# Patient Record
Sex: FEMALE | Race: WHITE
[De-identification: names, ages, dates, MRNs, and addresses within clinical notes are randomized per-mention and may not be internally consistent; named-entity substitution may affect disease eponyms.]

---

## 2017-04-07 ENCOUNTER — HOSPITAL ENCOUNTER (EMERGENCY)
Dept: HOSPITAL 79 - ER1 | Age: 52
Discharge: HOME | End: 2017-04-07
Payer: COMMERCIAL

## 2017-04-07 DIAGNOSIS — F17.210: ICD-10-CM

## 2017-04-07 DIAGNOSIS — J45.909: ICD-10-CM

## 2017-04-07 DIAGNOSIS — Z79.899: ICD-10-CM
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
## 2017-04-07 DIAGNOSIS — Z88.0: ICD-10-CM
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
## 2017-04-07 DIAGNOSIS — Z88.5: ICD-10-CM

## 2017-04-07 DIAGNOSIS — S61.412A: Primary | ICD-10-CM

## 2022-01-22 ENCOUNTER — HOSPITAL ENCOUNTER (EMERGENCY)
Dept: HOSPITAL 79 - ER1 | Age: 57
Discharge: HOME | End: 2022-01-22
Payer: COMMERCIAL

## 2022-01-22 DIAGNOSIS — Z87.442: ICD-10-CM

## 2022-01-22 DIAGNOSIS — R10.9: Primary | ICD-10-CM

## 2022-01-22 DIAGNOSIS — F17.200: ICD-10-CM

## 2022-01-22 DIAGNOSIS — E87.6: ICD-10-CM

## 2022-01-22 LAB
BUN/CREATININE RATIO: 17 (ref 0–10)
HGB BLD-MCNC: 12.2 GM/DL (ref 12.3–15.3)
RED BLOOD COUNT: 4.29 M/UL (ref 4–5.1)
WHITE BLOOD COUNT: 5.8 K/UL (ref 4.5–11)

## 2022-05-12 ENCOUNTER — TELEMEDICINE (OUTPATIENT)
Dept: PSYCHIATRY | Facility: CLINIC | Age: 57
End: 2022-05-12

## 2022-05-12 VITALS
DIASTOLIC BLOOD PRESSURE: 86 MMHG | HEIGHT: 59 IN | BODY MASS INDEX: 33.83 KG/M2 | OXYGEN SATURATION: 96 % | SYSTOLIC BLOOD PRESSURE: 128 MMHG | WEIGHT: 167.8 LBS | HEART RATE: 100 BPM

## 2022-05-12 DIAGNOSIS — F11.11 HISTORY OF HEROIN ABUSE: ICD-10-CM

## 2022-05-12 DIAGNOSIS — G47.9 SLEEP DIFFICULTIES: ICD-10-CM

## 2022-05-12 DIAGNOSIS — F15.10 METHAMPHETAMINE ABUSE: ICD-10-CM

## 2022-05-12 DIAGNOSIS — F41.1 GENERALIZED ANXIETY DISORDER: Primary | ICD-10-CM

## 2022-05-12 DIAGNOSIS — F11.20 OPIOID DEPENDENCE IN CONTROLLED ENVIRONMENT (HCC): ICD-10-CM

## 2022-05-12 PROCEDURE — 90792 PSYCH DIAG EVAL W/MED SRVCS: CPT | Performed by: NURSE PRACTITIONER

## 2022-05-12 RX ORDER — NALTREXONE HYDROCHLORIDE 50 MG/1
50 TABLET, FILM COATED ORAL DAILY
COMMUNITY

## 2022-05-12 RX ORDER — TRAZODONE HYDROCHLORIDE 100 MG/1
100 TABLET ORAL NIGHTLY
Qty: 30 TABLET | Refills: 0 | Status: SHIPPED | OUTPATIENT
Start: 2022-05-12 | End: 2022-06-09 | Stop reason: SDUPTHER

## 2022-05-12 RX ORDER — TRAZODONE HYDROCHLORIDE 50 MG/1
50 TABLET ORAL NIGHTLY
COMMUNITY
End: 2022-05-12 | Stop reason: SDUPTHER

## 2022-05-12 RX ORDER — MIRTAZAPINE 15 MG/1
7.5 TABLET, FILM COATED ORAL NIGHTLY
COMMUNITY
End: 2022-05-12

## 2022-05-12 RX ORDER — DULOXETIN HYDROCHLORIDE 30 MG/1
30 CAPSULE, DELAYED RELEASE ORAL DAILY
Qty: 30 CAPSULE | Refills: 0 | Status: SHIPPED | OUTPATIENT
Start: 2022-05-12 | End: 2022-06-09 | Stop reason: SDUPTHER
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
## 2022-06-09 ENCOUNTER — TELEMEDICINE (OUTPATIENT)
Dept: PSYCHIATRY | Facility: CLINIC | Age: 57
End: 2022-06-09

## 2022-06-09 VITALS
DIASTOLIC BLOOD PRESSURE: 78 MMHG | WEIGHT: 169 LBS | TEMPERATURE: 97.6 F | SYSTOLIC BLOOD PRESSURE: 118 MMHG | BODY MASS INDEX: 34.07 KG/M2 | HEART RATE: 72 BPM | HEIGHT: 59 IN

## 2022-06-09 DIAGNOSIS — F11.20 OPIOID DEPENDENCE IN CONTROLLED ENVIRONMENT (HCC): ICD-10-CM

## 2022-06-09 DIAGNOSIS — F41.1 GENERALIZED ANXIETY DISORDER: Primary | ICD-10-CM

## 2022-06-09 DIAGNOSIS — G47.9 SLEEP DIFFICULTIES: ICD-10-CM

## 2022-06-09 DIAGNOSIS — F11.11 HISTORY OF HEROIN ABUSE: ICD-10-CM

## 2022-06-09 DIAGNOSIS — F15.10 METHAMPHETAMINE ABUSE: ICD-10-CM

## 2022-06-09 PROCEDURE — 99214 OFFICE O/P EST MOD 30 MIN: CPT | Performed by: NURSE PRACTITIONER

## 2022-06-09 RX ORDER — DULOXETIN HYDROCHLORIDE 60 MG/1
60 CAPSULE, DELAYED RELEASE ORAL DAILY
COMMUNITY
End: 2022-06-16 | Stop reason: SDUPTHER

## 2022-06-09 RX ORDER — DULOXETIN HYDROCHLORIDE 60 MG/1
60 CAPSULE, DELAYED RELEASE ORAL DAILY
Qty: 30 CAPSULE | Refills: 0 | Status: SHIPPED | OUTPATIENT
Start: 2022-06-09 | End: 2022-06-09

## 2022-06-09 RX ORDER — TRAZODONE HYDROCHLORIDE 100 MG/1
100 TABLET ORAL NIGHTLY
Qty: 30 TABLET | Refills: 0 | Status: SHIPPED | OUTPATIENT
Start: 2022-06-09 | End: 2022-07-07 | Stop reason: SDUPTHER

## 2022-06-09 NOTE — PROGRESS NOTES
"This provider is located at [facility], [address] [place] [address].The Patient is seen remotely at [facility], [address] [address] via [employer]. Patient is being seen via telehealth and confirm that they are in a secure environment for this session. The patient's condition being diagnosed/treated is appropriate for telemedicine. The provider identified himself/herself: herself as well as her credentials.   The patient gave consent to be seen remotely, and when consent is given they understand that the consent allows for patient identifiable information to be sent to a third party as needed.   They may refuse to be seen remotely at any time. The electronic data is encrypted and password protected, and the patient has been advised of the potential risks to privacy not withstanding such measures.    You have chosen to receive care through a telehealth visit.  Do you consent to use a video/audio connection for your medical care today? Yes. Patient verified Name, , and address.       Chief Complaint  Anxiety     Subjective          Michelle Radha Pacheco presents to [facility] for medication management.     History of Present Illness  Patient presents today at [facility] accompanied by STEFANIE Valdez but interviewed alone.  Patient states that she has been doing \"pretty well\".  It is noted that she has been moved over to the [facility] last week in which she states her anxiety has been increased.  She notes that she has felt more \"jittery and nervous\".  Patient states that she realizes that she is phasing up and has more responsibility so that has made her anxious.  Patient denies any side effects with the medications.  She rates her anxiety as 7 on a scale of 0-10 with 10 being the worst.  Denies any major depressive issues.  Reports she is sleeping and eating well.  According to STEFANIE Valdez patient's Cymbalta was increased to " "60 mg roughly 3 weeks ago for her neuropathic pains.  Informed patient that it may take a few more weeks before she notices a decrease in her anxiety but if she any issues to let LPN Courtney aware both verbalized understanding.  Patient notes still continues pain and encouraged her to follow up with her PCP next week and mention so.  Denies any SI/HI/AH/VH.        Objective   Vital Signs:   /78   Pulse 72   Temp 97.6 °F (36.4 °C)   Ht 149.9 cm (59\")   Wt 76.7 kg (169 lb)   BMI 34.13 kg/m²       PHQ-9 Score:   PHQ-9 Total Score: (P) 2     Patient screened positive for depression based on a PHQ-9 score of  on . Follow-up recommendations include: see notes and medication list.    PHQ-9 Depression Screening  Little interest or pleasure in doing things? (P) 0   Feeling down, depressed, or hopeless? (P) 0   Trouble falling or staying asleep, or sleeping too much? (P) 0   Feeling tired or having little energy? (P) 1   Poor appetite or overeating? (P) 0   Feeling bad about yourself - or that you are a failure or have let yourself or your family down? (P) 0   Trouble concentrating on things, such as reading the newspaper or watching television? (P) 1   Moving or speaking so slowly that other people could have noticed? Or the opposite - being so fidgety or restless that you have been moving around a lot more than usual? (P) 0   Thoughts that you would be better off dead, or of hurting yourself in some way? (P) 0   PHQ-9 Total Score (P) 2   If you checked off any problems, how difficult have these problems made it for you to do your work, take care of things at home, or get along with other people? (P) Not difficult at all     PHQ-9 Total Score: (P) 2      Feeling nervous, anxious or on edge: (P) Several days  Not being able to stop or control worrying: (P) Not at all  Worrying too much about different things: (P) Not at all  Trouble Relaxing: (P) Not at all  Being so restless that it is hard to sit still: (P) Not at " all  Feeling afraid as if something awful might happen: (P) Not at all  Becoming easily annoyed or irritable: (P) Not at all  MAXI 7 Total Score: (P) 1  If you checked any problems, how difficult have these problems made it for you to do your work, take care of things at home, or get along with other people: (P) Somewhat difficult      PROMIS scale screening tool that patient filled out virtually reviewed by this APRN at today's encounter.      Mental Status Exam:   Hygiene:   good  Cooperation:  Cooperative  Eye Contact:  Good  Psychomotor Behavior:  Restless  Affect:  Appropriate  Mood: normal and anxious  Speech:  Normal  Thought Process:  Goal directed  Thought Content:  Normal  Suicidal:  None  Homicidal:  None  Hallucinations:  None  Delusion:  None  Memory:  Intact  Orientation:  Person, Place, Time and Situation  Reliability:  good  Insight:  Good and Fair  Judgement:  Good and Fair  Impulse Control:  Good and Fair  Physical/Medical Issues:  Yes COPD and rheumatoid arthritis     Current Medications:   Current Outpatient Medications   Medication Sig Dispense Refill   • DULoxetine (CYMBALTA) 60 MG capsule Take 60 mg by mouth Daily.     • naltrexone (DEPADE) 50 MG tablet Take 50 mg by mouth Daily.     • traZODone (DESYREL) 100 MG tablet Take 1 tablet by mouth Every Night. 30 tablet 0   • Albuterol Sulfate (PROAIR HFA IN) Inhale.       No current facility-administered medications for this visit.       Physical Exam  Vitals and nursing note reviewed.   Constitutional:       Appearance: Normal appearance.   Neurological:      Mental Status: She is alert.   Psychiatric:         Attention and Perception: Attention and perception normal.         Mood and Affect: Affect normal. Mood is anxious.         Speech: Speech normal.         Behavior: Behavior is cooperative.         Thought Content: Thought content normal.         Cognition and Memory: Cognition and memory normal.        Result Review :     The following data  was reviewed by: FABIAN Prakash on 06/09/2022:      Data reviewed: see media tab from [facility]        Assessment and Plan    Problem List Items Addressed This Visit    None     Visit Diagnoses     Generalized anxiety disorder    -  Primary    Relevant Medications    traZODone (DESYREL) 100 MG tablet    DULoxetine (CYMBALTA) 60 MG capsule    Sleep difficulties        Relevant Medications    traZODone (DESYREL) 100 MG tablet    Methamphetamine abuse (HCC)        Opioid dependence in controlled environment (HCC)        History of heroin abuse (HCC)                TREATMENT PLAN/GOALS: Continue supportive psychotherapy efforts and medications as indicated. Treatment and medication options discussed during today's visit. Patient ackowledged and verbally consented to continue with current treatment plan and was educated on the importance of compliance with treatment and follow-up appointments.    MEDICATION ISSUES:  We discussed risks, benefits, and side effects of the above medications and the patient was agreeable with the plan. Patient was educated on the importance of compliance with treatment and follow-up appointments.  Patient is agreeable to call the office with any worsening of symptoms or onset of side effects. Patient is agreeable to call 911 or go to the nearest ER should he/she begin having SI/HI.      -Continue trazodone 100 mg at night for sleep.  -Patient's PCP is prescribing her duloxetine at 60 mg daily for her RA.  Encourage patient that it may be a few more weeks before she notices any difference with her anxiety as well.  Encouraged her if it had not improved to make LPN Courtney aware as week make some changes patient verbalized understanding.      Counseled patient regarding multimodal approach with healthy nutrition, healthy sleep, regular physical activity, social activities, counseling, and medications.      Coping skills reviewed and encouraged positive framing of thoughts     Assisted  patient in processing above session content; acknowledged and normalized patient’s thoughts, feelings, and concerns.  Applied  positive coping skills and behavior management in session.  Allowed patient to freely discuss issues without interruption or judgment. Provided safe, confidential environment to facilitate the development of positive therapeutic relationship and encourage open, honest communication. Assisted patient in identifying risk factors which would indicate the need for higher level of care including thoughts to harm self or others and/or self-harming behavior and encouraged patient to contact this office, call 911, or present to the nearest emergency room should any of these events occur. Discussed crisis intervention services and means to access.     MEDS ORDERED DURING VISIT:  New Medications Ordered This Visit   Medications   • traZODone (DESYREL) 100 MG tablet     Sig: Take 1 tablet by mouth Every Night.     Dispense:  30 tablet     Refill:  0           Follow Up   Return in about 4 weeks (around 7/7/2022), or if symptoms worsen or fail to improve, for Recheck.    Patient was given instructions and counseling regarding her condition or for health maintenance advice. Please see specific information pulled into the AVS if appropriate.     Some of the data in this electronic note has been brought forward from a previous encounter, any necessary changes have been made, it has been reviewed by this APRN, and it is accurate.    This document has been electronically signed by FABIAN Prakash  June 9, 2022 13:43 EDT    Part of this note may be an electronic transcription/translation of spoken language to printed text using the Dragon Dictation System.

## 2022-06-16 DIAGNOSIS — F41.1 GENERALIZED ANXIETY DISORDER: ICD-10-CM

## 2022-06-16 RX ORDER — DULOXETIN HYDROCHLORIDE 30 MG/1
CAPSULE, DELAYED RELEASE ORAL
Qty: 90 CAPSULE | Refills: 0 | Status: SHIPPED | OUTPATIENT
Start: 2022-06-16 | End: 2022-07-07 | Stop reason: SDUPTHER

## 2022-06-16 NOTE — PROGRESS NOTES
According to STEFANIE Valdez patient had mentioned to her PCP some depressive and anxiety symptoms so was placed on Paxil 10 mg as PCP was not aware that she was seeing behavioral health.  Courtney WATTS intervened and patient did state that she was feeling more down and depressed lately and anxious.  She encouraged patient that she has to let her know the symptoms and she does have a mental health provider and inform PCP so PCP discontinued paroxetine and told her to follow up with myself.  Will change Cymbalta and increased.  Patient is to take 30 mg in the a.m. and 60 mg in the evening to help with depression and anxiety symptoms as well as pain.  If ineffective by next visit we will reevaluate once seeing patient.  STEFANIE Valdez verbalized understanding.

## 2022-07-07 ENCOUNTER — TELEMEDICINE (OUTPATIENT)
Dept: PSYCHIATRY | Facility: CLINIC | Age: 57
End: 2022-07-07

## 2022-07-07 VITALS
HEART RATE: 105 BPM | TEMPERATURE: 99 F | WEIGHT: 166 LBS | SYSTOLIC BLOOD PRESSURE: 126 MMHG | DIASTOLIC BLOOD PRESSURE: 90 MMHG | HEIGHT: 59 IN | BODY MASS INDEX: 33.47 KG/M2 | OXYGEN SATURATION: 98 %

## 2022-07-07 DIAGNOSIS — G47.9 SLEEP DIFFICULTIES: ICD-10-CM

## 2022-07-07 DIAGNOSIS — F11.11 HISTORY OF HEROIN ABUSE: ICD-10-CM

## 2022-07-07 DIAGNOSIS — F41.1 GENERALIZED ANXIETY DISORDER: Primary | ICD-10-CM

## 2022-07-07 DIAGNOSIS — F11.20 OPIOID DEPENDENCE IN CONTROLLED ENVIRONMENT (HCC): ICD-10-CM

## 2022-07-07 DIAGNOSIS — F15.10 METHAMPHETAMINE ABUSE: ICD-10-CM

## 2022-07-07 PROCEDURE — 99214 OFFICE O/P EST MOD 30 MIN: CPT | Performed by: NURSE PRACTITIONER

## 2022-07-07 RX ORDER — LORATADINE 10 MG/1
10 CAPSULE, LIQUID FILLED ORAL DAILY
COMMUNITY

## 2022-07-07 RX ORDER — TRAZODONE HYDROCHLORIDE 100 MG/1
100 TABLET ORAL NIGHTLY
Qty: 90 TABLET | Refills: 0 | Status: SHIPPED | OUTPATIENT
Start: 2022-07-07

## 2022-07-07 RX ORDER — DULOXETIN HYDROCHLORIDE 30 MG/1
CAPSULE, DELAYED RELEASE ORAL
Qty: 270 CAPSULE | Refills: 0 | Status: SHIPPED | OUTPATIENT
Start: 2022-07-07

## 2022-07-07 RX ORDER — TIZANIDINE HYDROCHLORIDE 4 MG/1
4 CAPSULE, GELATIN COATED ORAL 3 TIMES DAILY PRN
COMMUNITY

## 2022-07-07 RX ORDER — BUDESONIDE AND FORMOTEROL FUMARATE DIHYDRATE 160; 4.5 UG/1; UG/1
2 AEROSOL RESPIRATORY (INHALATION)
COMMUNITY

## 2022-07-07 NOTE — PROGRESS NOTES
This provider is located at [facility], [address] [place] [address].The Patient is seen remotely at [address] [address] [facility]. Patient is being seen via telehealth and confirm that they are in a secure environment for this session. The patient's condition being diagnosed/treated is appropriate for telemedicine. The provider identified himself/herself: herself as well as her credentials.   The patient gave consent to be seen remotely, and when consent is given they understand that the consent allows for patient identifiable information to be sent to a third party as needed.   They may refuse to be seen remotely at any time. The electronic data is encrypted and password protected, and the patient has been advised of the potential risks to privacy not withstanding such measures.    You have chosen to receive care through a telehealth visit.  Do you consent to use a video/audio connection for your medical care today? Yes. Patient verified Name, , and address.       Chief Complaint  Follow-up for anxiety     Subjective    Michelle Radha Pacheco presents to [facility] for medication management.     History of Present Illness  Patient presents today at [facility] accompanied by STEFANIE Valdez but interviewed alone.  Patient states that she is doing much better with the change in Cymbalta.  She denies any depressive symptoms or hopelessness or helplessness.  Patient states that she is falling asleep well with the trazodone it is just her hip and back pain that wakes her up but not and affects her sleep but otherwise she is sleeping well getting 5 to 6 hours at night.  Reports her appetite is good.  Denies any anxiety symptoms.  See PHQ-9 and MAXI-7.  Patient states that she leaves on  this month as she will be living with her mother and father and helping out with her father who is sick at the time.  " Patient states that she has been to be doing NA programs and has already been to 4 meetings.  Patient states that she is going to continue with behavioral health for therapy and her PCP for medication and then she has an appointment with [facility] for therapy as well.  Updated med list per LPN Courtney as patient had not updated.  Patient denied any side effects to the medications.  Denied any SI/HI/AH/VH.        Objective   Vital Signs:   /90   Pulse 105   Temp 99 °F (37.2 °C)   Ht 149.9 cm (59\")   Wt 75.3 kg (166 lb)   SpO2 98%   BMI 33.53 kg/m²       PHQ-9 Score:   PHQ-9 Total Score: (P) 1     Patient screened positive for depression based on a PHQ-9 score of  on . Follow-up recommendations include: see notes and medication list.    PHQ-9 Depression Screening  Little interest or pleasure in doing things? (P) 0   Feeling down, depressed, or hopeless? (P) 0   Trouble falling or staying asleep, or sleeping too much? (P) 0   Feeling tired or having little energy? (P) 1   Poor appetite or overeating? (P) 0   Feeling bad about yourself - or that you are a failure or have let yourself or your family down? (P) 0   Trouble concentrating on things, such as reading the newspaper or watching television? (P) 0   Moving or speaking so slowly that other people could have noticed? Or the opposite - being so fidgety or restless that you have been moving around a lot more than usual? (P) 0   Thoughts that you would be better off dead, or of hurting yourself in some way? (P) 0   PHQ-9 Total Score (P) 1   If you checked off any problems, how difficult have these problems made it for you to do your work, take care of things at home, or get along with other people? (P) Not difficult at all     PHQ-9 Total Score: (P) 1      Feeling nervous, anxious or on edge: (P) Not at all  Not being able to stop or control worrying: (P) Not at all  Worrying too much about different things: (P) Not at all  Trouble Relaxing: (P) Not at " all  Being so restless that it is hard to sit still: (P) Not at all  Feeling afraid as if something awful might happen: (P) Not at all  Becoming easily annoyed or irritable: (P) Not at all  MAXI 7 Total Score: (P) 0  If you checked any problems, how difficult have these problems made it for you to do your work, take care of things at home, or get along with other people: (P) Not difficult at all      PROMIS scale screening tool that patient filled out virtually reviewed by this APRN at today's encounter.      Mental Status Exam:   Hygiene:   good  Cooperation:  Cooperative  Eye Contact:  Good  Psychomotor Behavior:  Restless  Affect:  Appropriate  Mood: normal  Speech:  Normal  Thought Process:  Goal directed  Thought Content:  Normal  Suicidal:  None  Homicidal:  None  Hallucinations:  None  Delusion:  None  Memory:  Intact  Orientation:  Person, Place, Time and Situation  Reliability:  good  Insight:  Good and Fair  Judgement:  Good and Fair  Impulse Control:  Good and Fair  Physical/Medical Issues:  Yes COPD and rheumatoid arthritis     Current Medications:   Current Outpatient Medications   Medication Sig Dispense Refill   • Albuterol Sulfate (PROAIR HFA IN) Inhale.     • budesonide-formoterol (SYMBICORT) 160-4.5 MCG/ACT inhaler Inhale 2 puffs 2 (Two) Times a Day.     • DULoxetine (CYMBALTA) 30 MG capsule Take 1 tablet in AM and 2 tablets at night. 270 capsule 0   • Loratadine 10 MG capsule Take 10 mg by mouth Daily.     • naltrexone (DEPADE) 50 MG tablet Take 50 mg by mouth Daily.     • TiZANidine (ZANAFLEX) 4 MG capsule Take 4 mg by mouth 3 (Three) Times a Day As Needed for Muscle Spasms.     • traZODone (DESYREL) 100 MG tablet Take 1 tablet by mouth Every Night. 90 tablet 0     No current facility-administered medications for this visit.       Physical Exam  Vitals and nursing note reviewed.   Constitutional:       Appearance: Normal appearance.   Neurological:      Mental Status: She is alert.   Psychiatric:          Attention and Perception: Attention and perception normal.         Mood and Affect: Mood and affect normal. Mood is not anxious.         Speech: Speech normal.         Behavior: Behavior is cooperative.         Thought Content: Thought content normal.         Cognition and Memory: Cognition and memory normal.        Result Review :     The following data was reviewed by: FABIAN Prakash on 06/09/2022:      Data reviewed: see media tab from [facility]        Assessment and Plan    Problem List Items Addressed This Visit    None     Visit Diagnoses     Generalized anxiety disorder    -  Primary    Relevant Medications    traZODone (DESYREL) 100 MG tablet    DULoxetine (CYMBALTA) 30 MG capsule    Sleep difficulties        Relevant Medications    traZODone (DESYREL) 100 MG tablet    Methamphetamine abuse (HCC)        Opioid dependence in controlled environment (HCC)        History of heroin abuse (HCC)                TREATMENT PLAN/GOALS: Continue supportive psychotherapy efforts and medications as indicated. Treatment and medication options discussed during today's visit. Patient ackowledged and verbally consented to continue with current treatment plan and was educated on the importance of compliance with treatment and follow-up appointments.    MEDICATION ISSUES:  We discussed risks, benefits, and side effects of the above medications and the patient was agreeable with the plan. Patient was educated on the importance of compliance with treatment and follow-up appointments.  Patient is agreeable to call the office with any worsening of symptoms or onset of side effects. Patient is agreeable to call 911 or go to the nearest ER should he/she begin having SI/HI.      -Continue trazodone 100 mg at night for sleep.  -Continue Cymbalta 30 mg in a.m. and 60 mg at night for depression and anxiety symptoms as well as neuropathic pains.  PCP will be taking over her medications according to patient.  -Patient  will be leaving in 20 days and is stabilized doing well.  We will not need to follow-up but she will be following up with [facility] care and her PCP but if she did have any major issues or concerns to contact the clinic while she was at [facility] and N Courtney aware they verbalized understanding.      Counseled patient regarding multimodal approach with healthy nutrition, healthy sleep, regular physical activity, social activities, counseling, and medications.      Coping skills reviewed and encouraged positive framing of thoughts     Assisted patient in processing above session content; acknowledged and normalized patient’s thoughts, feelings, and concerns.  Applied  positive coping skills and behavior management in session.  Allowed patient to freely discuss issues without interruption or judgment. Provided safe, confidential environment to facilitate the development of positive therapeutic relationship and encourage open, honest communication. Assisted patient in identifying risk factors which would indicate the need for higher level of care including thoughts to harm self or others and/or self-harming behavior and encouraged patient to contact this office, call 911, or present to the nearest emergency room should any of these events occur. Discussed crisis intervention services and means to access.     MEDS ORDERED DURING VISIT:  New Medications Ordered This Visit   Medications   • traZODone (DESYREL) 100 MG tablet     Sig: Take 1 tablet by mouth Every Night.     Dispense:  90 tablet     Refill:  0   • DULoxetine (CYMBALTA) 30 MG capsule     Sig: Take 1 tablet in AM and 2 tablets at night.     Dispense:  270 capsule     Refill:  0           Follow Up   Return for Recheck with PCP .    Patient was given instructions and counseling regarding her condition or for health maintenance advice. Please see specific information pulled into the AVS if appropriate.     Some of the data in this electronic note has been  brought forward from a previous encounter, any necessary changes have been made, it has been reviewed by this APRN, and it is accurate.    This document has been electronically signed by FABIAN Prakash  July 7, 2022 13:41 EDT    Part of this note may be an electronic transcription/translation of spoken language to printed text using the Dragon Dictation System.